# Patient Record
Sex: MALE | Race: WHITE | Employment: UNEMPLOYED | URBAN - METROPOLITAN AREA
[De-identification: names, ages, dates, MRNs, and addresses within clinical notes are randomized per-mention and may not be internally consistent; named-entity substitution may affect disease eponyms.]

---

## 2019-12-30 ENCOUNTER — HOSPITAL ENCOUNTER (OUTPATIENT)
Dept: GENERAL RADIOLOGY | Age: 39
Discharge: HOME | End: 2019-12-30
Payer: COMMERCIAL

## 2019-12-30 DIAGNOSIS — Z98.2 S/P VP SHUNT: ICD-10-CM

## 2019-12-30 PROCEDURE — 70250 X-RAY EXAM OF SKULL: CPT

## 2020-04-08 ENCOUNTER — HOSPITAL ENCOUNTER (OUTPATIENT)
Dept: CT IMAGING | Age: 40
Discharge: HOME | End: 2020-04-08
Attending: NEUROLOGICAL SURGERY
Payer: COMMERCIAL

## 2020-04-08 DIAGNOSIS — Z98.2 S/P VP SHUNT: ICD-10-CM

## 2020-04-08 DIAGNOSIS — R51.9 WORSENING HEADACHES: ICD-10-CM

## 2020-04-08 PROCEDURE — 70450 CT HEAD/BRAIN W/O DYE: CPT

## 2020-06-22 PROBLEM — D49.9 TUMOR: Status: ACTIVE | Noted: 2020-06-22

## 2020-06-24 PROBLEM — R51.9 HEADACHE DISORDER: Status: ACTIVE | Noted: 2020-06-24

## 2020-06-24 PROBLEM — G47.30 SLEEP DISORDER BREATHING: Status: ACTIVE | Noted: 2020-06-24

## 2020-06-24 PROBLEM — E66.01 OBESITY, MORBID (HCC): Status: ACTIVE | Noted: 2020-06-24

## 2020-06-24 PROBLEM — Z79.899 ENCOUNTER FOR MEDICATION MANAGEMENT: Status: ACTIVE | Noted: 2020-06-24

## 2020-06-24 PROBLEM — G43.019 INTRACTABLE MIGRAINE WITHOUT AURA AND WITHOUT STATUS MIGRAINOSUS: Status: ACTIVE | Noted: 2020-06-24

## 2020-06-24 PROBLEM — Z98.2 VENTRICULAR SHUNT IN PLACE: Status: ACTIVE | Noted: 2020-06-24

## 2021-05-27 ENCOUNTER — HOSPITAL ENCOUNTER (OUTPATIENT)
Dept: CT IMAGING | Age: 41
Discharge: HOME | End: 2021-05-27
Attending: NEUROLOGICAL SURGERY
Payer: OTHER GOVERNMENT

## 2021-05-27 DIAGNOSIS — Z98.2 S/P VP SHUNT: ICD-10-CM

## 2021-05-27 DIAGNOSIS — R51.0 POSITIONAL HEADACHE: ICD-10-CM

## 2021-05-27 PROCEDURE — 70450 CT HEAD/BRAIN W/O DYE: CPT

## 2022-03-18 PROBLEM — G43.019 INTRACTABLE MIGRAINE WITHOUT AURA AND WITHOUT STATUS MIGRAINOSUS: Status: ACTIVE | Noted: 2020-06-24

## 2022-03-19 PROBLEM — R51.9 HEADACHE DISORDER: Status: ACTIVE | Noted: 2020-06-24

## 2022-03-19 PROBLEM — D49.9 TUMOR: Status: ACTIVE | Noted: 2020-06-22

## 2022-03-19 PROBLEM — Z98.2 VENTRICULAR SHUNT IN PLACE: Status: ACTIVE | Noted: 2020-06-24

## 2022-03-19 PROBLEM — Z79.899 ENCOUNTER FOR MEDICATION MANAGEMENT: Status: ACTIVE | Noted: 2020-06-24

## 2022-03-20 PROBLEM — G47.30 SLEEP DISORDER BREATHING: Status: ACTIVE | Noted: 2020-06-24

## 2022-03-20 PROBLEM — E66.01 OBESITY, MORBID (HCC): Status: ACTIVE | Noted: 2020-06-24

## 2022-08-08 ENCOUNTER — OFFICE VISIT (OUTPATIENT)
Dept: NEUROLOGY | Age: 42
End: 2022-08-08

## 2022-08-08 VITALS
DIASTOLIC BLOOD PRESSURE: 99 MMHG | HEIGHT: 67 IN | HEART RATE: 79 BPM | SYSTOLIC BLOOD PRESSURE: 140 MMHG | WEIGHT: 315 LBS | BODY MASS INDEX: 49.44 KG/M2

## 2022-08-08 DIAGNOSIS — G43.019 INTRACTABLE MIGRAINE WITHOUT AURA AND WITHOUT STATUS MIGRAINOSUS: Primary | ICD-10-CM

## 2022-08-08 DIAGNOSIS — Z98.2 VENTRICULAR SHUNT IN PLACE: ICD-10-CM

## 2022-08-08 DIAGNOSIS — Z79.899 ENCOUNTER FOR MEDICATION MANAGEMENT: ICD-10-CM

## 2022-08-08 DIAGNOSIS — G40.909 SEIZURE DISORDER (HCC): ICD-10-CM

## 2022-08-08 DIAGNOSIS — R51.9 HEADACHE DISORDER: ICD-10-CM

## 2022-08-08 PROCEDURE — 99214 OFFICE O/P EST MOD 30 MIN: CPT | Performed by: PSYCHIATRY & NEUROLOGY

## 2022-08-08 RX ORDER — LAMOTRIGINE 200 MG/1
400 TABLET ORAL 2 TIMES DAILY
Qty: 180 TABLET | Refills: 3 | Status: SHIPPED | OUTPATIENT
Start: 2022-08-08 | End: 2022-08-08 | Stop reason: SDUPTHER

## 2022-08-08 RX ORDER — LAMOTRIGINE 200 MG/1
400 TABLET ORAL 2 TIMES DAILY
Qty: 180 TABLET | Refills: 3 | Status: SHIPPED | OUTPATIENT
Start: 2022-08-08

## 2022-08-08 RX ORDER — TOPIRAMATE 100 MG/1
100 TABLET, FILM COATED ORAL 2 TIMES DAILY
Qty: 180 TABLET | Refills: 3 | Status: SHIPPED | OUTPATIENT
Start: 2022-08-08 | End: 2022-08-08

## 2022-08-08 RX ORDER — TOPIRAMATE 100 MG/1
100 TABLET, FILM COATED ORAL 2 TIMES DAILY
Qty: 180 TABLET | Refills: 3 | Status: SHIPPED | OUTPATIENT
Start: 2022-08-08

## 2022-08-08 ASSESSMENT — ENCOUNTER SYMPTOMS
BACK PAIN: 1
GASTROINTESTINAL NEGATIVE: 1
RESPIRATORY NEGATIVE: 1
EYES NEGATIVE: 1

## 2022-08-08 ASSESSMENT — VISUAL ACUITY: OU: 1

## 2022-08-08 NOTE — PROGRESS NOTES
NEUROLOGY  RETURN  OFFICE VISIT [de-identified]                     8/8/2022  Ad Barrios is a 35 y.o. male here for [de-identified]      migraine headaches and other Headaches. Some near shunt. Referred by      [facility]. .       Chief Complaint:   Chief Complaint   Patient presents with    Follow-up    Migraine     Unaccompanied. .. Here for RTO    Last visit =  8/6/2021  1. Headache disorder  2. Migraine without aura and without status migrainosus, not intractable  -     ubrogepant (Ubrelvy) 100 mg tablet; Take 1 Tablet by mouth once as needed for Migraine (one at onset migraine repat in two hours if needed. maximum 2/day 4/week.  keep diary.) for up to 1 dose. Indications: a migraine headache  3. Intractable migraine without aura and without status migrainosus  -     topiramate (TOPAMAX) 100 mg tablet; Take 1 Tablet by mouth two (2) times a day. Indications: migraine prevention  -     lamoTRIgine (LaMICtal) 200 mg tablet; Take 2 Tablets by mouth two (2) times a day. 4. Encounter for medication management  5. Ventricular shunt in place  overall improved. .. Speech better  . Donald Power Headache migraine better. .. Migraine   Associated symptoms include back pain, dizziness and tingling. Pertinent negatives include no neck pain, seizures or weakness. Active Problems:    * No active hospital problems. *  Resolved Problems:    * No resolved hospital problems. *    Past Medical History:   Diagnosis Date    Hypercholesterolemia     Other ill-defined conditions(799.89)     astrocytoma    Tumor 6/22/2020    brain     Past Surgical History:   Procedure Laterality Date    NEUROLOGICAL SURGERY      shunt / partial removal      No family history on file.   Social History     Socioeconomic History    Marital status:      Spouse name: None    Number of children: None    Years of education: None    Highest education level: None   Tobacco Use    Smoking status: Never    Smokeless tobacco: Never   Substance and Sexual Activity    Alcohol use: No    Drug use: No        Current Outpatient Medications   Medication Sig Dispense Refill    lamoTRIgine (LAMICTAL) 200 MG tablet Take 2 tablets by mouth in the morning and 2 tablets before bedtime. 180 tablet 3    topiramate (TOPAMAX) 100 MG tablet Take 1 tablet by mouth in the morning and 1 tablet before bedtime. 180 tablet 3    Ubrogepant 100 MG TABS Take 100 mg by mouth once as needed (migraine) 16 tablet 11     No current facility-administered medications for this visit. Allergies   Allergen Reactions    Wasp Venom Protein Hives       REVIEW OTHER RECORDS:    Review of Systems   Constitutional: Negative. HENT: Negative. Eyes: Negative. Respiratory: Negative. Cardiovascular: Negative. Gastrointestinal: Negative. Genitourinary: Negative. Musculoskeletal:  Positive for back pain. Negative for falls, joint pain, myalgias and neck pain. Skin: Negative. Neurological:  Positive for dizziness, tingling and headaches (better    now for Refill). Negative for tremors, sensory change, speech change, focal weakness, seizures, loss of consciousness and weakness. Migraine      Shunt      Endo/Heme/Allergies: Negative. Psychiatric/Behavioral: Negative. All other systems reviewed and are negative. REVIEW IMAGING:     Objective:     Vitals:    08/08/22 1534   BP: (!) 140/99   Pulse: 79   Weight: (!) 316 lb (143.3 kg)   Height: 5' 7\" (1.702 m)        Physical Exam  Constitutional:       General: He is awake. He is not in acute distress. Appearance: He is well-developed and well-groomed. He is not ill-appearing, toxic-appearing or diaphoretic. HENT:      Head: Normocephalic and atraumatic. No raccoon eyes, abrasion, contusion, right periorbital erythema, left periorbital erythema or laceration. Right Ear: Hearing normal.      Left Ear: Hearing normal.   Eyes:      General: Lids are normal. Vision grossly intact.  No scleral icterus. Right eye: No discharge. Left eye: No discharge. Extraocular Movements: Extraocular movements intact. Conjunctiva/sclera: Conjunctivae normal.      Pupils: Pupils are equal, round, and reactive to light. Neck:      Trachea: Phonation normal.   Pulmonary:      Effort: Pulmonary effort is normal. No respiratory distress. Breath sounds: No wheezing. Musculoskeletal:         General: No swelling or deformity. Cervical back: Normal range of motion. No rigidity or torticollis. Skin:     Coloration: Skin is not cyanotic, jaundiced or pale. Nails: There is no clubbing. Neurological:      Mental Status: He is alert and easily aroused. Mental status is at baseline. Cranial Nerves: Cranial nerves are intact. No cranial nerve deficit, dysarthria or facial asymmetry. Motor: No weakness, tremor, atrophy or seizure activity. Coordination: Coordination is intact. Coordination normal.      Gait: Gait is intact. Gait normal.   Psychiatric:         Attention and Perception: Attention normal.         Mood and Affect: Mood normal.         Speech: Speech normal.         Behavior: Behavior normal. Behavior is cooperative. Neurologic Exam     Mental Status   Speech: speech is normal   Level of consciousness: alert  Knowledge: good. Normal comprehension. Cranial Nerves     CN III, IV, VI   Pupils are equal, round, and reactive to light. Gait, Coordination, and Reflexes     Gait  Gait: normal    Tremor   Resting tremor: absent  Intention tremor: absent  Action tremor: absent  There is no tic, twitch, tonic or clonic activity noted. No dyskinesia. Assessment & Plan     Faye Hodge was seen today for follow-up and migraine. Diagnoses and all orders for this visit:    Intractable migraine without aura and without status migrainosus  -     Discontinue: topiramate (TOPAMAX) 100 MG tablet;  Take 1 tablet by mouth in the morning and 1 tablet before bedtime.  -     Discontinue: Ubrogepant 100 MG TABS; Take 100 mg by mouth once as needed (migraine)  -     topiramate (TOPAMAX) 100 MG tablet; Take 1 tablet by mouth in the morning and 1 tablet before bedtime.  -     Ubrogepant 100 MG TABS; Take 100 mg by mouth once as needed (migraine)    Headache disorder    Encounter for medication management    Ventricular shunt in place    Seizure disorder (HCC)  -     Discontinue: lamoTRIgine (LAMICTAL) 200 MG tablet; Take 2 tablets by mouth in the morning and 2 tablets before bedtime.  -     lamoTRIgine (LAMICTAL) 200 MG tablet; Take 2 tablets by mouth in the morning and 2 tablets before bedtime. Migraine 2 -3 /month   Non-migraine  10/month. Shunt - no problems. (Sees Dr Chirag Joyce at neurosurgery on two yr cycle evals). Needs to obtain the PCP. See one yr. Rx  done. He is followed at [place]. Can fax above to [place]. All drugs and rx reviewed fully with patient and acknowledged specific to neurology as well. Differential diagnostic decisions and thoughts reviewed and discussed. Updating to patient identification, coordinate neurology visits, reasons for follow, review neurologic problems. Extensive time[de-identified]  Total time  30 minutes -       More than 50% of this visit was spent in counseling and care coordination. Treatment options fully reviewed with patient. Time includes pre-  and post- face-face time in records review, and preparation including available pertinent images and reports. Acknowledgements obtained where needed.    [ *NOTE: parts of this note were produced using artificial speech recognition software, which may have inadvertent errors in the produced wordings. ]          Carlo Harkins MD  Consultative Neurology, [facility] [address] [address]   [place] [address] [place] [address]  Phone:  [phone]  Fax:   [phone]        Signed By: Carlo Harkins MD     August 8, 2022

## 2022-08-25 DIAGNOSIS — G43.019 INTRACTABLE MIGRAINE WITHOUT AURA AND WITHOUT STATUS MIGRAINOSUS: Primary | ICD-10-CM

## 2022-08-25 RX ORDER — RIMEGEPANT SULFATE 75 MG/75MG
75 TABLET, ORALLY DISINTEGRATING ORAL PRN
Qty: 14 TABLET | Refills: 11 | Status: SHIPPED | OUTPATIENT
Start: 2022-08-25

## 2022-08-29 ENCOUNTER — TELEPHONE (OUTPATIENT)
Dept: NEUROLOGY | Age: 42
End: 2022-08-29

## 2022-11-14 ENCOUNTER — TELEPHONE (OUTPATIENT)
Dept: NEUROSURGERY | Age: 42
End: 2022-11-14

## 2022-11-14 ENCOUNTER — CLINICAL DOCUMENTATION (OUTPATIENT)
Dept: NEUROSURGERY | Age: 42
End: 2022-11-14

## 2022-11-14 NOTE — TELEPHONE ENCOUNTER
----- Message from [address] [place] [address] Blvd sent at 11/14/2022  3:00 PM EST -----    ----- Message -----  From: Brooklynn Noland  Sent: 11/14/2022   2:58 PM EST  To: Rosie Alegre MD, Millie LEE ANN Xenia    Pt is having an MRI through [place] tomorrow and was wondering if he needs to follow up with you prior or afterwards?  -he does apologize such short notice -[facility] just called [facility]. [phone] -note to self.

## 2022-11-14 NOTE — PROGRESS NOTES
Pt called stating he having MRI tomorrow just making sure he doesn't need to f/u with Dr. Kranthi Patel prior or afterwards. Message sent to Dr. Kranthi Patel -just amisha. Delbert Cool MD  [address] [place] [address] can schedule him for follow-up           Previous Messages     ----- Message -----   From: Hector Ross   Sent: 11/14/2022   2:58 PM EST   To: Delbert Cool MD, Yana NANCE Xenia     Pt is having an MRI through [facility] tomorrow and was wondering if he needs to follow up with you prior or afterwards?   -he does apologize such short notice -[facility] just [facility]. [phone] -note to self.

## 2022-11-14 NOTE — TELEPHONE ENCOUNTER
[facility] tomorrow for tumor resection comparison and shunt .   The patient is wondering if he should follow up with Dr. Magali Stokes in the office with MRI results

## 2022-11-15 ENCOUNTER — TELEPHONE (OUTPATIENT)
Dept: NEUROSURGERY | Age: 42
End: 2022-11-15

## 2022-11-15 NOTE — TELEPHONE ENCOUNTER
Patient is having an MRI this evening at 7pm with [facility]. Patient is requesting a f/u to reprogram his shunt following the MRI. Ok to put patient on schedule for tomorrow?

## 2022-11-15 NOTE — TELEPHONE ENCOUNTER
Per DR. Yoon - ok for an appointment to discuss MRI findings- pt to call back to make an appointment with a burned MRI CD

## 2022-11-15 NOTE — TELEPHONE ENCOUNTER
Appt scheduled for 11/16 @ 3:45pm with Dr. Orestes Paul. Detailed voicemail left for the patient with the info above. Kira Jones is working on [facility] and [place].

## 2022-11-16 ENCOUNTER — OFFICE VISIT (OUTPATIENT)
Dept: NEUROSURGERY | Age: 42
End: 2022-11-16
Payer: OTHER GOVERNMENT

## 2022-11-16 VITALS
HEIGHT: 67 IN | TEMPERATURE: 98.8 F | DIASTOLIC BLOOD PRESSURE: 77 MMHG | BODY MASS INDEX: 49.44 KG/M2 | SYSTOLIC BLOOD PRESSURE: 132 MMHG | HEART RATE: 81 BPM | OXYGEN SATURATION: 96 % | WEIGHT: 315 LBS

## 2022-11-16 DIAGNOSIS — Z98.2 VP (VENTRICULOPERITONEAL) SHUNT STATUS: Primary | ICD-10-CM

## 2022-11-16 DIAGNOSIS — Z85.841 HISTORY OF ASTROCYTOMA OF BRAIN: ICD-10-CM

## 2022-11-16 DIAGNOSIS — Z98.890 HISTORY OF CRANIOTOMY: ICD-10-CM

## 2022-11-16 PROCEDURE — 62252 CSF SHUNT REPROGRAM: CPT | Performed by: NEUROLOGICAL SURGERY

## 2022-11-16 PROCEDURE — 99214 OFFICE O/P EST MOD 30 MIN: CPT | Performed by: NEUROLOGICAL SURGERY

## 2022-11-16 RX ORDER — UBROGEPANT 100 MG/1
TABLET ORAL
COMMUNITY
Start: 2021-08-06

## 2022-11-16 NOTE — PROGRESS NOTES
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 Lucian Mar is a 38 y.o. male who presents today for follow-up evaluation regarding his  shunt as he has a Medtronic strata valve set to a performance setting of 1.0 ([id number]). He is here to discuss the findings of his most recent MRI as well as confirmed the setting of his  shunt following an MRI yesterday. His MRI strata valve is not MRI resistant. He denies any other new headaches, loss of consciousness or further recent seizures. 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 Demonstrates a suboccipital craniectomy without evidence of recurrence of the positive astrocytoma. There is stable appearance of his calcified third ventricular mass. There is no change in the size or morphology of the ventricular system concerning for hydrocephalus or shunt malfunction. Past Medical History:   Diagnosis Date    Hypercholesterolemia     Other ill-defined conditions(799.89)     astrocytoma    Tumor 6/22/2020    brain      Past Surgical History:   Procedure Laterality Date    NEUROLOGICAL SURGERY      shunt / partial removal     Allergies   Allergen Reactions    Wasp Venom Protein Hives      No family history on file. Social History     Socioeconomic History    Marital status:      Spouse name: Not on file    Number of children: Not on file    Years of education: Not on file    Highest education level: Not on file   Occupational History    Not on file   Tobacco Use    Smoking status: Never    Smokeless tobacco: Never   Substance and Sexual Activity    Alcohol use:  No Drug use: No    Sexual activity: Not on file   Other Topics Concern    Not on file   Social History Narrative    Not on file     Social Determinants of Health     Financial Resource Strain: Not on file   Food Insecurity: Not on file   Transportation Needs: Not on file   Physical Activity: Not on file   Stress: Not on file   Social Connections: Not on file   Intimate Partner Violence: Not on file   Housing Stability: Not on file     Current Outpatient Medications   Medication Sig Dispense Refill    Ubrogepant (UBRELVY) 100 MG TABS       onabotulinumtoxin A (BOTOX) 100 units injection       lamoTRIgine (LAMICTAL) 200 MG tablet Take 2 tablets by mouth in the morning and 2 tablets before bedtime. 180 tablet 3    topiramate (TOPAMAX) 100 MG tablet Take 1 tablet by mouth in the morning and 1 tablet before bedtime. 180 tablet 3     No current facility-administered medications for this visit.      Patient Active Problem List   Diagnosis    Intractable migraine without aura and without status migrainosus    Ventricular shunt in place    Encounter for medication management    Tumor    Headache disorder    Sleep disorder breathing    Obesity, morbid ([facility] [address]        Review of Systems    Physical Exam:  /77   Pulse 81   Temp 98.8 °F (37.1 °C) (Temporal)   Ht 5' 7\" (1.702 m)   Wt (!) 318 lb (144.2 kg)   SpO2 96%   BMI 49.81 kg/m²   Pain Score:   0 - No pain  Head normocephalic and atraumatic  Mood and affect appropriate  General: No acute distress  CV: Regular rate  Resp: No increased work of breathing  Skin: warm and dry  Awake, alert, and oriented   Speech fluent but dysarthric  Pupils equal and reactive to light  Eyes open spontaneously   Right ptosis with probable CN III involvement  Extraocular motor function appears intact slight lower facial asymmetry  Patient with strength exam as follows:   Upper Extremities: Right Left      Deltoid  5 5    Biceps  5 5    Triceps  5 5      5 5     Lower Extremities:      Hip Flex 5 5    Quads  5 5    Hamstrings 5 5    Dorsiflex 5 5    Plantarflex 5 5    EHL  5 5  Sensation grossly intact ambulates  Gait: normal nonantalgic gait, stands from a seated position without difficulty and ambulates independently  Right cranial incision well-healed  Right sided  shunt valve reservoir chamber pumps and refills readily    Assessment & Plan:  Bryan Parekh is a 38 y.o. male who presents today for follow-up evaluation regarding his  shunt as he has a Medtronic strata valve set to a performance setting of 1.0 ([id number]). 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 Demonstrates a suboccipital craniectomy without evidence of recurrence of the positive astrocytoma. There is stable appearance of his calcified third ventricular mass. There is no change in the size or morphology of the ventricular system concerning for hydrocephalus or shunt malfunction. I interrogated the patient's  shunt valve with the Medtronic strata 2  which confirmed his  shunt valve has been adjusted by the MRI and taken from a performance setting of 1 down to a performance setting of 0.5. I then used the Medtronic strata 2  and reprogrammed him from 0.5 to a setting of 1.0. This was confirmed after the programming with the Medtronic strata 2 . 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 I will therefore see him back in 1 year time with a repeat MRI brain with and without contrast.      ICD-10-CM    1.  (ventriculoperitoneal) shunt status [Z98.2 (ICD-10-CM)]  Z98.2       2. History of craniotomy [Z98.890 (ICD-10-CM)]  Z98.890       3.  History of astrocytoma of brain  Z85.841         Mio Stokes, [address] [place] [address]     Notes are transcribed with [address] [place] [address], a medical voice recording dictation service, and may contain minor errors.

## 2023-01-24 DIAGNOSIS — G43.019 INTRACTABLE MIGRAINE WITHOUT AURA AND WITHOUT STATUS MIGRAINOSUS: Primary | ICD-10-CM

## 2023-01-24 DIAGNOSIS — G40.909 SEIZURE DISORDER (HCC): ICD-10-CM

## 2023-01-25 RX ORDER — LAMOTRIGINE 200 MG/1
400 TABLET ORAL 2 TIMES DAILY
Qty: 180 TABLET | Refills: 3 | Status: SHIPPED | OUTPATIENT
Start: 2023-01-25

## 2023-01-25 RX ORDER — TOPIRAMATE 100 MG/1
100 TABLET, FILM COATED ORAL 2 TIMES DAILY
Qty: 180 TABLET | Refills: 3 | Status: SHIPPED | OUTPATIENT
Start: 2023-01-25

## 2023-02-07 DIAGNOSIS — G43.019 INTRACTABLE MIGRAINE WITHOUT AURA AND WITHOUT STATUS MIGRAINOSUS: Primary | ICD-10-CM

## 2023-02-07 DIAGNOSIS — G40.909 SEIZURE DISORDER (HCC): ICD-10-CM

## 2023-02-09 RX ORDER — TOPIRAMATE 100 MG/1
100 TABLET, FILM COATED ORAL 2 TIMES DAILY
Qty: 180 TABLET | Refills: 3 | Status: SHIPPED | OUTPATIENT
Start: 2023-02-09 | End: 2023-02-10 | Stop reason: SDUPTHER

## 2023-02-09 RX ORDER — UBROGEPANT 100 MG/1
100 TABLET ORAL PRN
Qty: 16 TABLET | Refills: 11 | Status: SHIPPED | OUTPATIENT
Start: 2023-02-09 | End: 2023-02-10 | Stop reason: SDUPTHER

## 2023-02-09 RX ORDER — LAMOTRIGINE 200 MG/1
400 TABLET ORAL 2 TIMES DAILY
Qty: 360 TABLET | Refills: 3 | Status: SHIPPED | OUTPATIENT
Start: 2023-02-09 | End: 2023-02-10 | Stop reason: SDUPTHER

## 2023-02-10 DIAGNOSIS — G40.909 SEIZURE DISORDER (HCC): ICD-10-CM

## 2023-02-10 DIAGNOSIS — G43.019 INTRACTABLE MIGRAINE WITHOUT AURA AND WITHOUT STATUS MIGRAINOSUS: ICD-10-CM

## 2023-02-10 RX ORDER — UBROGEPANT 100 MG/1
100 TABLET ORAL PRN
Qty: 16 TABLET | Refills: 11 | Status: SHIPPED | OUTPATIENT
Start: 2023-02-10

## 2023-02-10 RX ORDER — TOPIRAMATE 100 MG/1
100 TABLET, FILM COATED ORAL 2 TIMES DAILY
Qty: 180 TABLET | Refills: 3 | Status: SHIPPED | OUTPATIENT
Start: 2023-02-10

## 2023-02-10 RX ORDER — LAMOTRIGINE 200 MG/1
400 TABLET ORAL 2 TIMES DAILY
Qty: 360 TABLET | Refills: 3 | Status: SHIPPED | OUTPATIENT
Start: 2023-02-10

## 2023-02-14 ENCOUNTER — TELEPHONE (OUTPATIENT)
Dept: NEUROLOGY | Age: 43
End: 2023-02-14

## 2023-02-14 NOTE — TELEPHONE ENCOUNTER
Pharmacy called to get clarification on ubrevly. Pharmacy also stated that they are only able to dispense 10 tablets per 30 days.

## 2023-02-20 DIAGNOSIS — G40.909 SEIZURE DISORDER (HCC): Primary | ICD-10-CM

## 2023-02-20 RX ORDER — LAMOTRIGINE 200 MG/1
400 TABLET ORAL 2 TIMES DAILY
Qty: 360 TABLET | Refills: 3 | Status: SHIPPED | OUTPATIENT
Start: 2023-02-20

## 2023-02-20 NOTE — TELEPHONE ENCOUNTER
Pt stated that [facility] didn't receive the Lamictal script. Rx pending. Please Advise.      Done       To [facility]    Need[de-identified]   addd blood work =   CBC CMP

## 2023-08-21 ENCOUNTER — OFFICE VISIT (OUTPATIENT)
Dept: NEUROLOGY | Age: 43
End: 2023-08-21
Payer: OTHER GOVERNMENT

## 2023-08-21 VITALS
HEART RATE: 95 BPM | OXYGEN SATURATION: 95 % | WEIGHT: 315 LBS | SYSTOLIC BLOOD PRESSURE: 120 MMHG | DIASTOLIC BLOOD PRESSURE: 83 MMHG | BODY MASS INDEX: 50.12 KG/M2

## 2023-08-21 DIAGNOSIS — R51.9 HEADACHE DISORDER: ICD-10-CM

## 2023-08-21 DIAGNOSIS — G40.909 SEIZURE DISORDER (HCC): ICD-10-CM

## 2023-08-21 DIAGNOSIS — Z98.2 S/P VP SHUNT: ICD-10-CM

## 2023-08-21 DIAGNOSIS — G43.019 INTRACTABLE MIGRAINE WITHOUT AURA AND WITHOUT STATUS MIGRAINOSUS: ICD-10-CM

## 2023-08-21 DIAGNOSIS — G40.909 SEIZURE DISORDER (HCC): Primary | ICD-10-CM

## 2023-08-21 DIAGNOSIS — Z79.899 ENCOUNTER FOR MEDICATION MANAGEMENT: ICD-10-CM

## 2023-08-21 PROCEDURE — 99214 OFFICE O/P EST MOD 30 MIN: CPT | Performed by: PSYCHIATRY & NEUROLOGY

## 2023-08-21 RX ORDER — UBROGEPANT 100 MG/1
100 TABLET ORAL PRN
Qty: 16 TABLET | Refills: 11 | Status: SHIPPED | OUTPATIENT
Start: 2023-08-21 | End: 2023-08-22 | Stop reason: SDUPTHER

## 2023-08-21 ASSESSMENT — ENCOUNTER SYMPTOMS
BACK PAIN: 0
DOUBLE VISION: 0
NAUSEA: 0
BLURRED VISION: 0

## 2023-08-21 ASSESSMENT — VISUAL ACUITY: OU: 1

## 2023-08-21 NOTE — PROGRESS NOTES
neurologic problems. Extensive time[de-identified]  Total time  *** minutes -       More than 50% of this visit was spent in counseling and care coordination. Treatment options fully reviewed with patient. Time includes pre-  and post- face-face time in records review, and preparation including available pertinent images and reports. Acknowledgements obtained where needed.    [ *NOTE: parts of this note were produced using artificial speech recognition software, which may have inadvertent errors in the produced wordings. ]          Skylar Eaton MD  Consultative Neurology, [address] [place] [address]  Phone:  [phone]  Fax:   [phone]        Signed By: Lexy Javed LPN     August 21, 2186
well-developed and well-groomed. He is not ill-appearing, toxic-appearing or diaphoretic. HENT:      Head: Normocephalic and atraumatic. No raccoon eyes, abrasion, contusion, right periorbital erythema, left periorbital erythema or laceration. Right Ear: Hearing normal.      Left Ear: Decreased hearing (from basic training accident 2003) noted. Eyes:      General: Lids are normal. Vision grossly intact. No visual field deficit or scleral icterus. Right eye: No discharge. Left eye: No discharge. Extraocular Movements: Extraocular movements intact. Left eye: Normal extraocular motion and no nystagmus. Conjunctiva/sclera: Conjunctivae normal.      Pupils: Pupils are equal, round, and reactive to light. Comments: Abnl right eyelid   Neck:      Trachea: Phonation normal.   Pulmonary:      Effort: Pulmonary effort is normal. No respiratory distress. Breath sounds: No wheezing. Musculoskeletal:         General: No swelling, tenderness, deformity or signs of injury. Normal range of motion. Cervical back: Normal range of motion and neck supple. No signs of trauma, rigidity or torticollis. Normal range of motion. Right lower leg: No edema. Left lower leg: No edema. Skin:     General: Skin is warm and dry. Capillary Refill: Capillary refill takes less than 2 seconds. Coloration: Skin is not ashen, cyanotic, jaundiced or pale. Nails: There is no clubbing. Neurological:      Mental Status: He is alert and easily aroused. Mental status is at baseline. Cranial Nerves: No cranial nerve deficit, dysarthria or facial asymmetry. Motor: No weakness, tremor, atrophy, abnormal muscle tone or seizure activity. Coordination: Coordination is intact. Coordination normal.      Gait: Gait normal.      Comments: No Summer.    Psychiatric:         Attention and Perception: Attention normal.         Mood and Affect: Mood normal.         Speech:

## 2023-08-22 LAB
LAMOTRIGINE SERPL-MCNC: 2 UG/ML (ref 2–20)
TOPIRAMATE SERPL-MCNC: 2.2 UG/ML (ref 2–25)

## 2023-08-22 RX ORDER — LAMOTRIGINE 200 MG/1
400 TABLET ORAL 2 TIMES DAILY
Qty: 360 TABLET | Refills: 3 | Status: SHIPPED | OUTPATIENT
Start: 2023-08-22

## 2023-08-22 RX ORDER — UBROGEPANT 100 MG/1
100 TABLET ORAL PRN
Qty: 16 TABLET | Refills: 11 | Status: SHIPPED | OUTPATIENT
Start: 2023-08-22

## 2023-08-30 DIAGNOSIS — G40.909 SEIZURE DISORDER (HCC): ICD-10-CM

## 2023-08-30 RX ORDER — LAMOTRIGINE 200 MG/1
400 TABLET ORAL 2 TIMES DAILY
Qty: 360 TABLET | Refills: 3 | Status: SHIPPED | OUTPATIENT
Start: 2023-08-30

## 2023-08-30 NOTE — TELEPHONE ENCOUNTER
Patient called and requested a RF. For lamotrigine 200 mg by mouth 2 times daily.    [facility]

## 2023-09-21 ENCOUNTER — OFFICE VISIT (OUTPATIENT)
Dept: NEUROLOGY | Age: 43
End: 2023-09-21
Payer: OTHER GOVERNMENT

## 2023-09-21 DIAGNOSIS — G40.909 SEIZURE DISORDER (HCC): Primary | ICD-10-CM

## 2023-09-21 PROCEDURE — 95819 EEG AWAKE AND ASLEEP: CPT | Performed by: PSYCHIATRY & NEUROLOGY

## 2023-09-21 NOTE — PROGRESS NOTES
[facility] REPORT                                                           [address] [facility] [address], [address] [place] [address]                                                          [phone]       DATE[de-identified]        9/21/23    EEG Number:           Indication:    seizure disorder      Current Outpatient Medications:     lamoTRIgine (LAMICTAL) 200 MG tablet, Take 2 tablets by mouth 2 times daily, Disp: 360 tablet, Rfl: 3    Ubrogepant (UBRELVY) 100 MG TABS, Take 100 mg by mouth as needed (migraine), Disp: 16 tablet, Rfl: 11    topiramate (TOPAMAX) 100 MG tablet, Take 1 tablet by mouth 2 times daily, Disp: 180 tablet, Rfl: 3    onabotulinumtoxinA (BOTOX) 100 units injection, , Disp: , Rfl:       Technique: This EEG was performed using the [employer] 10/20 System. An EKG was monitored. The length of the recording was 30 minutes. Normal stage II sleep . Normal   awake. Drowsy and asleep. State of Consciousness:       10 / 20 IEP 21 channel Xltek equipment bipolar / referential recordings for 30 + minutes using standard montages and technique. Background:  Normal.     8 - 9  Hz. Rhythms:  Normal. Symmetric. Epileptiform:  None. Symmetry :  Normal.     Alpha:  8 hz = normal amount. Normal attenuation with eye opening. Beta:   13 + Hz and good amplitudes :  Normal amount. Normal symmetry. Theta:   5-7 Hz:  Normal amounts. Delta:   2 - 3 hz:   Normal amounts. No bizarre or unusual rhythms. O2 Sat[de-identified]    91-97 % average. EKG[de-identified]  72    Activation Procedures:  Hyperventlation:       deferred     Photic Stimulation:        driving    Interpretation:            Normal awake to sleeping EEG.

## 2023-09-27 PROBLEM — G40.909 SEIZURE DISORDER (HCC): Status: ACTIVE | Noted: 2023-09-27

## 2024-01-15 DIAGNOSIS — G40.909 SEIZURE DISORDER (HCC): ICD-10-CM

## 2024-01-16 RX ORDER — LAMOTRIGINE 200 MG/1
400 TABLET ORAL 2 TIMES DAILY
Qty: 360 TABLET | Refills: 3 | Status: SHIPPED | OUTPATIENT
Start: 2024-01-16